# Patient Record
Sex: FEMALE | Race: WHITE | Employment: OTHER | URBAN - METROPOLITAN AREA
[De-identification: names, ages, dates, MRNs, and addresses within clinical notes are randomized per-mention and may not be internally consistent; named-entity substitution may affect disease eponyms.]

---

## 2021-05-31 ENCOUNTER — HOSPITAL ENCOUNTER (OUTPATIENT)
Facility: HOSPITAL | Age: 86
Setting detail: OBSERVATION
Discharge: NURSING, REHAB OR LONG-TERM CARE | End: 2021-06-03
Attending: EMERGENCY MEDICINE | Admitting: INTERNAL MEDICINE
Payer: MEDICARE

## 2021-05-31 ENCOUNTER — APPOINTMENT (OUTPATIENT)
Dept: CT IMAGING | Facility: HOSPITAL | Age: 86
End: 2021-05-31
Attending: EMERGENCY MEDICINE
Payer: MEDICARE

## 2021-05-31 DIAGNOSIS — R31.9 URINARY TRACT INFECTION WITH HEMATURIA, SITE UNSPECIFIED: Primary | ICD-10-CM

## 2021-05-31 DIAGNOSIS — N39.0 URINARY TRACT INFECTION WITH HEMATURIA, SITE UNSPECIFIED: Primary | ICD-10-CM
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
## 2021-05-31 PROCEDURE — 93010 ELECTROCARDIOGRAM REPORT: CPT

## 2021-05-31 PROCEDURE — 70450 CT HEAD/BRAIN W/O DYE: CPT | Performed by: EMERGENCY MEDICINE

## 2021-05-31 PROCEDURE — 87077 CULTURE AEROBIC IDENTIFY: CPT | Performed by: EMERGENCY MEDICINE

## 2021-05-31 PROCEDURE — 87086 URINE CULTURE/COLONY COUNT: CPT | Performed by: EMERGENCY MEDICINE

## 2021-05-31 PROCEDURE — 85025 COMPLETE CBC W/AUTO DIFF WBC: CPT | Performed by: EMERGENCY MEDICINE

## 2021-05-31 PROCEDURE — 87186 SC STD MICRODIL/AGAR DIL: CPT | Performed by: EMERGENCY MEDICINE

## 2021-05-31 PROCEDURE — 93005 ELECTROCARDIOGRAM TRACING: CPT

## 2021-05-31 PROCEDURE — 83690 ASSAY OF LIPASE: CPT | Performed by: EMERGENCY MEDICINE

## 2021-05-31 PROCEDURE — 96365 THER/PROPH/DIAG IV INF INIT: CPT

## 2021-05-31 PROCEDURE — 80053 COMPREHEN METABOLIC PANEL: CPT | Performed by: EMERGENCY MEDICINE

## 2021-05-31 PROCEDURE — 81001 URINALYSIS AUTO W/SCOPE: CPT | Performed by: EMERGENCY MEDICINE

## 2021-05-31 PROCEDURE — 84484 ASSAY OF TROPONIN QUANT: CPT | Performed by: EMERGENCY MEDICINE

## 2021-05-31 PROCEDURE — 99285 EMERGENCY DEPT VISIT HI MDM: CPT

## 2021-05-31 RX ORDER — OMEPRAZOLE 20 MG/1
20 CAPSULE, DELAYED RELEASE ORAL
COMMUNITY

## 2021-05-31 RX ORDER — LISINOPRIL 10 MG/1
10 TABLET ORAL DAILY
COMMUNITY

## 2021-05-31 RX ORDER — HEPARIN SODIUM 5000 [USP'U]/ML
5000 INJECTION, SOLUTION INTRAVENOUS; SUBCUTANEOUS 2 TIMES DAILY
COMMUNITY

## 2021-05-31 RX ORDER — METOPROLOL SUCCINATE 50 MG/1
50 TABLET, EXTENDED RELEASE ORAL 2 TIMES DAILY
Status: ON HOLD | COMMUNITY
End: 2021-06-01 | Stop reason: CLARIF

## 2021-06-01 ENCOUNTER — APPOINTMENT (OUTPATIENT)
Dept: GENERAL RADIOLOGY | Facility: HOSPITAL | Age: 86
End: 2021-06-01
Attending: EMERGENCY MEDICINE
Payer: MEDICARE

## 2021-06-01 PROBLEM — N39.0 URINARY TRACT INFECTION WITH HEMATURIA: Status: ACTIVE | Noted: 2021-06-01

## 2021-06-01 PROBLEM — N39.0 URINARY TRACT INFECTION WITH HEMATURIA, SITE UNSPECIFIED: Status: ACTIVE | Noted: 2021-06-01

## 2021-06-01 PROBLEM — R31.9 URINARY TRACT INFECTION WITH HEMATURIA: Status: ACTIVE | Noted: 2021-06-01

## 2021-06-01 PROBLEM — D64.9 ANEMIA: Status: ACTIVE | Noted: 2021-06-01

## 2021-06-01 PROBLEM — R31.9 URINARY TRACT INFECTION WITH HEMATURIA, SITE UNSPECIFIED: Status: ACTIVE | Noted: 2021-06-01

## 2021-06-01 PROBLEM — R79.89 AZOTEMIA: Status: ACTIVE | Noted: 2021-06-01

## 2021-06-01 PROBLEM — D69.6 THROMBOCYTOPENIA (HCC): Status: ACTIVE | Noted: 2021-06-01

## 2021-06-01 PROCEDURE — 71045 X-RAY EXAM CHEST 1 VIEW: CPT | Performed by: EMERGENCY MEDICINE

## 2021-06-01 PROCEDURE — 73502 X-RAY EXAM HIP UNI 2-3 VIEWS: CPT | Performed by: EMERGENCY MEDICINE

## 2021-06-01 PROCEDURE — 96366 THER/PROPH/DIAG IV INF ADDON: CPT

## 2021-06-01 PROCEDURE — 96372 THER/PROPH/DIAG INJ SC/IM: CPT

## 2021-06-01 PROCEDURE — 96361 HYDRATE IV INFUSION ADD-ON: CPT

## 2021-06-01 RX ORDER — SODIUM CHLORIDE 9 MG/ML
INJECTION, SOLUTION INTRAVENOUS CONTINUOUS
Status: DISCONTINUED | OUTPATIENT
Start: 2021-06-01 | End: 2021-06-03

## 2021-06-01 RX ORDER — ACETAMINOPHEN 325 MG/1
650 TABLET ORAL EVERY 4 HOURS PRN
Status: DISCONTINUED | OUTPATIENT
Start: 2021-06-01 | End: 2021-06-03

## 2021-06-01 RX ORDER — METOPROLOL SUCCINATE 50 MG/1
50 TABLET, EXTENDED RELEASE ORAL 2 TIMES DAILY
Status: DISCONTINUED | OUTPATIENT
Start: 2021-06-01 | End: 2021-06-01

## 2021-06-01 RX ORDER — ACETAMINOPHEN 325 MG/1
650 TABLET ORAL EVERY 6 HOURS PRN
Status: DISCONTINUED | OUTPATIENT
Start: 2021-06-01 | End: 2021-06-01

## 2021-06-01 RX ORDER — METOPROLOL TARTRATE 50 MG/1
50 TABLET, FILM COATED ORAL 2 TIMES DAILY
COMMUNITY

## 2021-06-01 RX ORDER — ACETAMINOPHEN 325 MG/1
650 TABLET ORAL EVERY 4 HOURS PRN
COMMUNITY

## 2021-06-01 RX ORDER — METOPROLOL TARTRATE 50 MG/1
50 TABLET, FILM COATED ORAL
Status: DISCONTINUED | OUTPATIENT
Start: 2021-06-01 | End: 2021-06-03

## 2021-06-01 RX ORDER — LISINOPRIL 10 MG/1
10 TABLET ORAL DAILY
Status: DISCONTINUED | OUTPATIENT
Start: 2021-06-01 | End: 2021-06-03

## 2021-06-01 RX ORDER — HEPARIN SODIUM 5000 [USP'U]/ML
5000 INJECTION, SOLUTION INTRAVENOUS; SUBCUTANEOUS 2 TIMES DAILY
Status: DISCONTINUED | OUTPATIENT
Start: 2021-06-01 | End: 2021-06-03

## 2021-06-01 RX ORDER — PANTOPRAZOLE SODIUM 40 MG/1
40 TABLET, DELAYED RELEASE ORAL
Status: DISCONTINUED | OUTPATIENT
Start: 2021-06-01 | End: 2021-06-03

## 2021-06-01 NOTE — ED INITIAL ASSESSMENT (HPI)
Pt arrived via EMS from [facility] for unwitnessed fall. Pt found next to bed, A&O x1 baseline. Pt does not remember falling, on heparin subcutaneous, pt has previous hip surgery. Denies any pain at this time.

## 2021-06-01 NOTE — CONSULTS
INFECTIOUS DISEASE CONSULTATION    Edwin Lucio Patient Status:  Observation    10/25/1927 MRN [id number]   [facility] 3SW-A Attending Yadira Albarado MD   Hosp Day # 0 PCP No primary care MG Oral Tab, Take 10 mg by mouth daily. , Disp: , Rfl:   Metoprolol Succinate ER 50 MG Oral Tablet 24 Hr, Take 50 mg by mouth 2 (two) times a day., Disp: , Rfl:   omeprazole 20 MG Oral Capsule Delayed Release, Take 20 mg by mouth every morning before breakf

## 2021-06-01 NOTE — CONSULTS
Patient is a 30-year-old white female transferred here from a nursing home. Patient had an open reduction internal fixation of a left femur fracture at [facility] approximately 2-1/2 weeks ago. Patient has no significant complaints of pain.     Yane

## 2021-06-01 NOTE — H&P
[facility]    History & Physical    Erma Kilpatrick Patient Status:  Observation    10/25/1927 MRN [id number]   [facility] 3SW-A Attending Phong Hernandez MD   Hosp Day # 0 PCP No primary care provider on file.      Date:  2021  Date o daily.  Metoprolol Succinate ER 50 MG Oral Tablet 24 Hr, Take 50 mg by mouth 2 (two) times a day.   omeprazole 20 MG Oral Capsule Delayed Release, Take 20 mg by mouth every morning before breakfast.        Review of Systems:   Pertinent items are noted in H CO2 30.0 05/31/2021     (H) 05/31/2021    CA 8.7 05/31/2021    ALB 2.6 (L) 05/31/2021    ALKPHO 98 05/31/2021    BILT 1.0 05/31/2021    TP 6.0 (L) 05/31/2021    AST 17 05/31/2021    ALT 11 (L) 05/31/2021    LIP 58 (L) 05/31/2021    TROP <0.045 05/31 No depressed calvarial fracture. CONCLUSION:  No acute intracranial hemorrhage or depressed calvarial fracture. 1.5 cm meningioma arising from the inferior right tentorium.   Diffuse cerebral and cerebellar atrophy with chronic microvascu

## 2021-06-01 NOTE — PLAN OF CARE
Pt  is AOx2,pleasant, forgetful,  on R. A.  VS are stable, IVF infusing, voiding freely, incontinent. Denies n/t.  abd pad  to left hip is cdi. Knee immobilizer on when OOB. Pt c/o no pain. IS education complete. Ankle pumps encouraged.   Pt instructed

## 2021-06-01 NOTE — ED PROVIDER NOTES
Patient Seen in: [facility] 3sw-a      History   Patient presents with:  Fall    Stated Complaint: Fall    HPI/Subjective:   HPI    66-year-old woman, here for evaluation of unwitnessed fall at the nursing home.   Patient had recent operative repair bed no acute distress  Head: Normocephalic and atraumatic. HEENT:  Mucous membranes are moist.   Cardiovascular:  Normal rate and regular rhythm. No Edema  Pulmonary:  Pulmonary effort is normal.  Normal breath sounds. No wheezing, rhonchi or rales.    A 67.4 (*)     Neutrophil Absolute Prelim 8.13 (*)     Neutrophil Absolute 8.13 (*)     Lymphocyte Absolute 0.84 (*)     All other components within normal limits   TROPONIN I - Normal   RAPID SARS-COV-2 BY PCR - Normal   CBC WITH DIFFERENTIAL WITH PLATELET sinusitis. MASTOIDS:          No sign of acute inflammation. SKULL:             No depressed calvarial fracture. CONCLUSION:  No acute intracranial hemorrhage or depressed calvarial fracture.   1.5 cm meningioma arising from the inferior right t

## 2021-06-01 NOTE — CM/SW NOTE
06/01/21 1100   CM/SW Referral Data   Referral Source Social Work (self-referral)   Reason for Referral Discharge planning   Informant Children   Patient Info   Patient's Mental Status Confused   Patient's Obdulio

## 2021-06-02 PROCEDURE — 97530 THERAPEUTIC ACTIVITIES: CPT

## 2021-06-02 PROCEDURE — 96361 HYDRATE IV INFUSION ADD-ON: CPT

## 2021-06-02 PROCEDURE — 96366 THER/PROPH/DIAG IV INF ADDON: CPT

## 2021-06-02 PROCEDURE — 85025 COMPLETE CBC W/AUTO DIFF WBC: CPT | Performed by: INTERNAL MEDICINE

## 2021-06-02 PROCEDURE — 84132 ASSAY OF SERUM POTASSIUM: CPT | Performed by: INTERNAL MEDICINE

## 2021-06-02 PROCEDURE — 97165 OT EVAL LOW COMPLEX 30 MIN: CPT

## 2021-06-02 PROCEDURE — 97161 PT EVAL LOW COMPLEX 20 MIN: CPT

## 2021-06-02 PROCEDURE — 80053 COMPREHEN METABOLIC PANEL: CPT | Performed by: INTERNAL MEDICINE

## 2021-06-02 PROCEDURE — 96372 THER/PROPH/DIAG INJ SC/IM: CPT

## 2021-06-02 RX ORDER — POTASSIUM CHLORIDE 1.5 G/1.77G
40 POWDER, FOR SOLUTION ORAL EVERY 4 HOURS
Status: COMPLETED | OUTPATIENT
Start: 2021-06-02 | End: 2021-06-02

## 2021-06-02 NOTE — PLAN OF CARE
Pt denies pain or discomfort. Repositioned in bed, sacrum and heels both intact. Non-pitting edema and bruising present to lt leg. Pt able to move toes, skin warm and dry to touch. Calf soft. On Ancef IV for UTI. VSS, afebrile.  Plan is return to [employer]

## 2021-06-02 NOTE — PHYSICAL THERAPY NOTE
PHYSICAL THERAPY EVALUATION - INPATIENT     Room Number: 353/353-A  Evaluation Date: 6/2/2021  Type of Evaluation: Initial  Physician Order: PT Eval and Treat    Presenting Problem: UTI, recent left femur ORIF  Reason for Therapy: Mobility Dysfunctio Touch Weight Bearing    PAIN ASSESSMENT  Ratin  Location: denies at this time  Management Techniques:  Activity promotion    COGNITION  · Overall Cognitive Status:  Impaired  · Orientation Level:  oriented to place and oriented to person  · Following Co railing?: Total       AM-PAC Score:  Raw Score: 10   Approx Degree of Impairment: 76.75%   Standardized Score (AM-PAC Scale): 32.29   CMS Modifier (G-Code): CL    FUNCTIONAL ABILITY STATUS  Gait Assessment   Gait Assistance: Not tested              Comment returning to prior to level of function. The -PAC '6-Clicks' Inpatient Basic Mobility Short Form was completed and this patient is demonstrating a 77% degree of impairment in mobility.  Research supports that patients with this level of impairment may be

## 2021-06-02 NOTE — PROGRESS NOTES
Patient several weeks post op ORIF distal left femur fracture. No complaints. Exam shows diffuse ecchymosis left thigh and knee. Minimal tenderness. No pain with passive motion of left leg with brace locked in extension.      Impression is that of sta

## 2021-06-02 NOTE — OCCUPATIONAL THERAPY NOTE
OCCUPATIONAL THERAPY EVALUATION - INPATIENT     Room Number: 353/353-A  Evaluation Date: 6/2/2021  Type of Evaluation: Initial  Presenting Problem: UTI, fall at [address], recent L distal femur fx s/p ORIF    Physician Order: IP Consult to Occupational Therapy mechanics;Breathing techniques;Relaxation;Repositioning    COGNITION  Orientation Level:  oriented to person and partially oriented to place and situation, disoriented to time  Following Commands:  follows one step commands consistently  Safety Judgement: extension supine. Pt able to assist in lifting LLE. Mod (A) to EOB from flat bed, difficulty scooting for LEs to reach floor. Completed UE testing. CGA dynamic sitting balance, supervision static sit. Total (A) to don socks.    Sit to stand max (A) of Specific performance deficits impacting engagement in ADL/IADL LOW  1 - 3 performance deficits    Client Assessment/Performance Deficits LOW - No comorbidities nor modifications of tasks    Clinical Decision Making LOW - Analysis of occupational profile,

## 2021-06-02 NOTE — PROGRESS NOTES
[facility]                INFECTIOUS DISEASE PROGRESS NOTE    Corinda Hearing Patient Status:  Observation    10/25/1927 MRN [id number]   [facility] 3SW-A Attending Halima Lr MD   Hosp Day # 0 PCP No primary care provider on fi [address] Encounter on 05/31/21   1.  Urine Culture, Routine Once     Status: Abnormal (Preliminary result)    Collection Time: 05/31/21 11:21 PM    Specimen: Urine, clean catch   Result Value Ref Range    Urine Culture 50,000-99,000 C

## 2021-06-02 NOTE — PROGRESS NOTES
[facility]  Progress Note    Senia Ritter Patient Status:  Observation    10/25/1927 MRN [id number]   [facility] 3SW-A Attending Ayanna Carson MD   Hosp Day # 0 PCP No primary care provider on file.          SUBJECTIVE:  Subjective: input(s): URINE, CULTI, BLDSMR in the last 168 hours. No results found for this visit on 05/31/21. CT BRAIN OR HEAD (28753) Once    Result Date: 5/31/2021  PROCEDURE:  CT BRAIN OR HEAD (06944)  COMPARISON:  None.   INDICATIONS:  Fall  TECHNIQUE:  Nonc seen.  Mild osteopenia is likely present which somewhat limits assessment. No pneumothorax is seen. Possible trace right pleural effusion. Mild cardiomegaly is likely present. There is some opacity in the retrocardiac region.   This may represent a comb 50 mg Oral 2x Daily(Beta Blocker)   • ceFAZolin  1 g Intravenous Q12H     • sodium chloride 100 mL/hr at 06/02/21 0532     acetaminophen       Assessment/Plan:   Patient Active Problem List:     Thrombocytopenia (HCC)     Anemia     Azotemia     Urinary tr non-tender, bowel sounds active all four quadrants,                Extremities:    no cyanosis, icterus or edema                                  Neurologic :  General weakness, no focal deficits                                      Data Review:       Labs CONCLUSION:  No acute intracranial hemorrhage or depressed calvarial fracture. 1.5 cm meningioma arising from the inferior right tentorium. Diffuse cerebral and cerebellar atrophy with chronic microvascular ischemic changes of aging.     Dictated by ( FINDINGS:  There is a comminuted and displaced fracture involving distal left femur. Mild displacement anteriorly is noted of the distal fracture fragment by approximately 13 mm which is best appreciated on the lateral view.   No evidence of hardware failu with hematuria  As above, IV antibiotics, follow cultures     History of fall–strict fall precautions              WEAKNESS- PT OT EVAL AND TREAT  DISPOSITION PER      See tests ordered,  Available and radiology reviewed  All consultant notes

## 2021-06-03 VITALS
TEMPERATURE: 98 F | HEART RATE: 53 BPM | WEIGHT: 145 LBS | RESPIRATION RATE: 20 BRPM | BODY MASS INDEX: 25.69 KG/M2 | SYSTOLIC BLOOD PRESSURE: 159 MMHG | DIASTOLIC BLOOD PRESSURE: 78 MMHG | OXYGEN SATURATION: 100 % | HEIGHT: 63 IN

## 2021-06-03 PROCEDURE — 96372 THER/PROPH/DIAG INJ SC/IM: CPT

## 2021-06-03 PROCEDURE — 85025 COMPLETE CBC W/AUTO DIFF WBC: CPT | Performed by: INTERNAL MEDICINE

## 2021-06-03 PROCEDURE — 80053 COMPREHEN METABOLIC PANEL: CPT | Performed by: INTERNAL MEDICINE

## 2021-06-03 NOTE — PLAN OF CARE
Patient A&O X2-3, forgetful/confused at times, hx dementia, on RA. VSS, . SCDs, subQ heparin. Incontinent with brief, LBM 6/1. Prior left hip ORIF covered with steri strips and coverlet, c/d/i. Denies n/t/pain. TTWB to LLE, immobilizer on when OOB.  PT/O

## 2021-06-03 NOTE — PLAN OF CARE
Alert and oriented x 2, forgetful at times. Denies pain. Coverlet dressings changed to left upper leg, steri-strips dry and intact. Fall precautions in place. Plan for discharge back to [facility] if cleared by all.  Okay to discharge per Dr Josse Mcgraw, no ant

## 2021-06-03 NOTE — PROGRESS NOTES
1030 Attempted to call report to [facility], no response from nursing station. 1140 Report given to Pardeep Rodriguez at [facility]. Ambulance pickup scheduled for 1300.

## 2021-06-03 NOTE — CM/SW NOTE
Spoke with pt's RN regarding possible DC to NH today. Updates sent to [facility] via [facility]. Received confirmation that pt can be accepted for readmission today if medically ready.   / to remain available for support and/or discha

## 2021-06-03 NOTE — DISCHARGE SUMMARY
[facility]  Discharge Summary    Gracia Merlin Patient Status:  Observation    10/25/1927 MRN [id number]   [facility] 3SW-A Attending Efra Ward MD   Hosp Day # 0 PCP No primary care provider on file.      Date of Admission: 20 Dictated by (CST): Maryan Sousa MD on 5/31/2021 at 11:52 PM     Finalized by (CST): Maryan Sousa MD on 5/31/2021 at 11:54 PM       XR CHEST AP PORTABLE  (CPT=71045) Once    Result Date: 6/1/2021  PROCEDURE:  XR CHEST AP PORTABLE  (CPT=71045)  TECHNIQU hardware failure. CONCLUSION:  Comminuted and displaced distal femoral fracture. A preliminary report was provided by [facility].     Dictated by (CST): Joice Curling, MD on 6/01/2021 at 8:09 AM     Finalized by (CST): Joice Curling, MD on 6/01/20 sounds active all four quadrants,                    Extremities:    no cyanosis, icterus                                   Neurologic :  General weakness,                                                        Data Review:       Labs:            Recent La No significant mass effect. Moderate decreased attenuation in the  periventricular white matter extending into the centrum semiovale bilaterally nonspecific although compatible with chronic microvascular ischemic changes of aging.   SINUSES:           No (CPT=73502) Once     Result Date: 6/1/2021  PROCEDURE:  XR HIP W OR WO PELVIS 2 OR 3 VIEWS, LEFT (CPT=73502)  TECHNIQUE:  Unilateral 2 to 3 views of the hip and pelvis if performed. COMPARISON:  None.   INDICATIONS:  Fall  PATIENT STATED HISTORY: (As trans Sandra Waller Patient Status:  Observation    10/25/1927 MRN [id number]   [facility] 3SW-A Attending Cecelia Quinn MD   Hosp Day # 0 PCP No primary care provider on file.            SUBJECTIVE:  Subjective:  Sandra Waller is a(n) 89 year 2.3*         No results for input(s): PGLU in the last 168 hours.     No results for input(s): URINE, CULTI, BLDSMR in the last 168 hours.           Hospital Encounter on 05/31/21   1.  Urine Culture, Routine Once     Status: Abnormal     Collection Time: 0 Once     Result Date: 6/1/2021  PROCEDURE:  XR CHEST AP PORTABLE  (CPT=71045)  TECHNIQUE:  AP chest radiograph was obtained. COMPARISON:  None.   INDICATIONS:  Fall  PATIENT STATED HISTORY: (As transcribed by Technologist)  Patient offered no additional hi (CST):  Susana Ruiz MD on 6/01/2021 at 8:09 AM     Finalized by (CST): Susana Ruiz MD on 6/01/2021 at 8:28 AM              Meds:      • Heparin Sodium (Porcine)  5,000 Units Subcutaneous BID   • lisinopril  10 mg Oral Daily   • Pantoprazole Sodium  40 mg Oral details  Procedures:  Please refer to chart for details    Disposition: Final discharge disposition not confirmed    Discharge Condition: Stable    Discharge Medications: Current Discharge Medication List    CONTINUE these medications which have NOT CHANGE

## 2021-06-03 NOTE — PROGRESS NOTES
[facility]  Progress Note    Erma Kilpatrick Patient Status:  Observation    10/25/1927 MRN [id number]   [facility] 3SW-A Attending Phong Hernandez MD   Hosp Day # 0 PCP No primary care provider on file.          SUBJECTIVE:  Subjective: 06/02/21  0449 06/02/21  1526 06/03/21  0512    141  --  140   K 4.1 3.5 5.3* 4.4    110  --  111   CO2 30.0 26.0  --  27.0   BUN 38* 27*  --  23*   CREATSERUM 0.76 0.54*  --  0.49*   CA 8.7 8.1*  --  8.1*   * 79  --  79       Recent Lab fracture. 1.5 cm meningioma arising from the inferior right tentorium. Diffuse cerebral and cerebellar atrophy with chronic microvascular ischemic changes of aging.     Dictated by (CST): Dee Goodman MD on 5/31/2021 at 11:52 PM     Finalized by (CST): left femur. Mild displacement anteriorly is noted of the distal fracture fragment by approximately 13 mm which is best appreciated on the lateral view. No evidence of hardware failure.             CONCLUSION:  Comminuted and displaced distal femoral fract 53  Resp:  [17-20] 20  BP: (113-160)/(53-89) 159/78    Intake/Output:    Intake/Output Summary (Last 24 hours) at 6/3/2021 1117  Last data filed at 6/3/2021 0928  Gross per 24 hour   Intake 360 ml   Output —   Net 360 ml       Exam  General Appearance: scanning is performed through the brain. Dose reduction techniques were used. Dose information is transmitted to the  [facility]) NRDR ([address]) which includes the Dose Index Registry.   PATIENT STATED HISTORY cardiac silhouette and/or atelectasis although pneumonia is difficult to exclude. CONCLUSION:  No displaced rib fracture is seen. No pneumothorax is seen.   Possible airspace opacity in the retrocardiac region could represent an area of pneumoni unspecified    Principal Problem:    Urinary tract infection with hematuria, site unspecified  Active Problems:     Thrombocytopenia ([address]    Anemia    Azotemia    Urinary tract infection with hematuria          Plan:  Continue present management,  Left fem

## (undated) NOTE — IP AVS SNAPSHOT
Patient Demographics     Address  [address] [place] [address] Phone  [phone] [facility])  [phone] [facility]      Emergency Contact(s)     Name Relation Home Work [place] Enamorado Son   [address]wa, [address]  [phone] 06/03/21 0549 Given      [id number] lisinopril tab 10 mg 06/03/21 0805 Given      [id number] potassium chloride (KLOR-CON) powder packet 40 mEq 06/02/21 1241 Given            LEFT LOWER ABDOMEN     Order ID Medication Name Action Time Action Reason Comments (EE)   AST 19 15 - 37 U/L Pradeep Forbes [facility])   ALT 10 13 - 56 U/L L [facility])   Alkaline Phosphatase 86 55 - 142 U/L — [facility])   Bilirubin, Total 0.8 0.1 - 2.0 mg/dL — [facility])   Total Protein 5.5 6.4 - 8.2 g/dL L [facility]) Collected: 05/31/21 7715    Order Status: Completed Lab Status: Preliminary result Updated: 06/03/21 0636    Specimen: Urine, clean catch      Urine Culture 50,000-99,000 CFU/ML Enterococcus species not VRE    Rapid SARS-CoV-2 by PCR STAT [[id number]]  (Nor with IV antibiotics, gentle hydration, ID consult, following urine culture      History     Past Medical History:   Diagnosis Date   • Arrhythmia     Atrial Fibrillation   • Dementia [facility])    • Disorder of thyroid    • Esophageal reflux    • High blood pre thyroid:  No enlargement/tenderness; no  JVD   Lungs:     Clear to auscultation bilaterally, respirations unlabored       Heart:    Regular rate and rhythm, S1 and S2 normal,    Abdomen:     Soft, non-tender, bowel sounds active all four quadrants, TECHNIQUE:  Noncontrast CT scanning is performed through the brain. Dose reduction techniques were used.  Dose information is transmitted to the  [facility]) NRDR ([address]) which includes the Dose Index Regist infection with hematuria, site unspecified  IV antibiotics, follow cultures      Thrombocytopenia (HCC)  Monitor counts      Anemia  Possibly anemia of chronic disease and acute blood loss from prior surgerymonitor H&H and transfuse for less than 7      Az states she is not sure how she fell she denies any pain or any injuries. Does not believe she struck her head.   States she is otherwise been in her usual state of health, denies any recent vomiting fevers, abdominal pain diarrhea chest pain shortness of b (698-507)/(57-43) 149/72    Intake/Output:    Intake/Output Summary (Last 24 hours) at 6/1/2021 0749  Last data filed at 6/1/2021 0147  Gross per 24 hour   Intake 600 ml   Output —   Net 600 ml       Exam  General Appearance:    Alert, cooperative, no dist [place] MD DEBORA on 5/31/2021 at 11:54 PM       EKG 12-LEAD    Result Date: 6/1/2021  Sinus rhythm with Premature supraventricular complexes Minimal voltage criteria for LVH, may be normal variant Borderline ECG No previous ECGs available       CT BRAIN OR HEAD ( hematuria, site unspecified[SK.1]    Left femur/left hip fracture–history of prior fracture–discussed with nurse Batsheva April, records from [facility] printed and inpatient orthopedic consult requested[SK.2]        Urinary tract infection with dressings were removed. The wounds of her left hip and left thigh are clean and dry. Diffuse ecchymosis is present. Staples are in place. Neurologically intact left lower extremity. Passive range of motion of her hip is without pain.   No instability i ORIF[SP.2]  Reason for Therapy: Mobility Dysfunction and Discharge Planning    History related to current admission: Pt admitted from [facility]/31/21 due to hematuria, diagnosed with UTI.   Pt with h/o left femur fracture due to fall, s/p left femur ORIF approx Management Techniques:  Activity promotion[SP.2]    COGNITION[SP.1]  · Overall Cognitive Status:  Impaired  · Orientation Level:  oriented to place and oriented to person  · Following Commands:  follows one step commands consistently  · Safety Judgement: railing?: Total[SP.2]       AM-PAC Score:[SP.1]  Raw Score: 10   Approx Degree of Impairment: 76.75%   Standardized Score (AM-PAC Scale): 32.29   CMS Modifier (G-Code): CL[SP.2]    FUNCTIONAL ABILITY STATUS  Gait Assessment[SP.1]   Gait Assistance: Not dago and would benefit from skilled inpatient PT to address the above deficits to assist patient in returning to prior to level of function.[SP.1]  The AM-PAC '6-Clicks' Inpatient Basic Mobility Short Form was completed and this patient is demonstrating a 77% d : Carl Roberts OT (Occupational Therapist)       OCCUPATIONAL THERAPY EVALUATION - INPATIENT     Room Number: 353/353-A  Evaluation Date: 6/2/2021  Type of Evaluation: Initial  Presenting Problem: UTI, fall at [address], recent L distal femur fx s/p ORIF Management Techniques: Activity promotion; Body mechanics;Breathing techniques;Relaxation;Repositioning    COGNITION  Orientation Level:  oriented to person and partially oriented to place and situation, disoriented to time  Following Commands:  follows one Therapy Provided:   Assist to don KI in extension supine. Pt able to assist in lifting LLE. Mod (A) to EOB from flat bed, difficulty scooting for LEs to reach floor. Completed UE testing. CGA dynamic sitting balance, supervision static sit.   Total (A) review of medical or therapy records    Specific performance deficits impacting engagement in ADL/IADL LOW  1 - 3 performance deficits    Client Assessment/Performance Deficits LOW - No comorbidities nor modifications of tasks    Clinical Decision Making L Outcome Interventions   Patient/Family Long Term Goal     Interdisciplinary Progressing    Description: Patient's Long Term Goal: go home    Interventions:  - IV antibiotics  - See additional Care Plan goals for specific interventions   Patient/Family Tammi Fajardo

## (undated) NOTE — IP AVS SNAPSHOT
[address]            (For Outpatient Use Only) Initial Admit Date: 5/31/2021   Inpt/Obs Admit Date: Inpt: N/A / Obs: 06/01/21   Discharge Date:    Citlali Reed:  [de-identified]   MRN: [de-identified]   CSN: [id number]   CEID: [id number] Insurance Type:    Subscriber Name:  Subscriber :    Subscriber ID:  Pt Rel to Subscriber:    Hospital Account Financial Class: Medicare    Stella 3, 2021